# Patient Record
Sex: MALE | Race: WHITE | Employment: OTHER
[De-identification: names, ages, dates, MRNs, and addresses within clinical notes are randomized per-mention and may not be internally consistent; named-entity substitution may affect disease eponyms.]

---

## 2024-05-30 ENCOUNTER — APPOINTMENT (OUTPATIENT)
Facility: HOSPITAL | Age: 29
End: 2024-05-30
Payer: MEDICAID

## 2024-05-30 ENCOUNTER — HOSPITAL ENCOUNTER (EMERGENCY)
Facility: HOSPITAL | Age: 29
Discharge: HOME | End: 2024-05-30
Attending: EMERGENCY MEDICINE
Payer: MEDICAID

## 2024-05-30 VITALS
OXYGEN SATURATION: 99 % | BODY MASS INDEX: 42.55 KG/M2 | SYSTOLIC BLOOD PRESSURE: 130 MMHG | HEIGHT: 69 IN | DIASTOLIC BLOOD PRESSURE: 81 MMHG | RESPIRATION RATE: 18 BRPM | WEIGHT: 287.26 LBS | TEMPERATURE: 97.7 F | HEART RATE: 98 BPM

## 2024-05-30 DIAGNOSIS — S93.492A SPRAIN OF ANTERIOR TALOFIBULAR LIGAMENT OF LEFT ANKLE, INITIAL ENCOUNTER: Primary | ICD-10-CM

## 2024-05-30 PROCEDURE — 73610 X-RAY EXAM OF ANKLE: CPT

## 2024-05-30 PROCEDURE — 73630 X-RAY EXAM OF FOOT: CPT

## 2024-05-30 PROCEDURE — 99283 EMERGENCY DEPT VISIT LOW MDM: CPT

## 2024-05-30 NOTE — ED TRIAGE NOTES
Pt arrives with a care giver from the group home who states he fell going up stairs and injured his left ankle, pt is non verbal and ambulated to treatment area with even steady gait.

## 2024-05-30 NOTE — ED PROVIDER NOTES
[facility] EMERGENCY DEPT  EMERGENCY DEPARTMENT ENCOUNTER      Pt Name: Jacob De Luna  MRN: [id number]  Birthdate 1995  Date of evaluation: 5/30/2024  Provider: Radha Godfrey DO    CHIEF COMPLAINT       Chief Complaint   Patient presents with    Fall         HISTORY OF PRESENT ILLNESS   (Location/Symptom, Timing/Onset, Context/Setting, Quality, Duration, Modifying Factors, Severity)  Note limiting factors.   HPI      Review of External Medical Records:     Nursing Notes were reviewed.    REVIEW OF SYSTEMS    (2-9 systems for level 4, 10 or more for level 5)     Review of Systems    Except as noted above the remainder of the review of systems was reviewed and negative.       PAST MEDICAL HISTORY   No past medical history on file.      SURGICAL HISTORY     No past surgical history on file.      CURRENT MEDICATIONS       Previous Medications    No medications on file       ALLERGIES     Patient has no known allergies.    FAMILY HISTORY     No family history on file.       SOCIAL HISTORY       Social History     Socioeconomic History    Marital status: Single           PHYSICAL EXAM    (up to 7 for level 4, 8 or more for level 5)     ED Triage Vitals [05/30/24 1745]   BP Temp Temp Source Pulse Respirations SpO2 Height Weight - Scale   130/81 97.7 °F (36.5 °C) Oral 98 18 99 % 1.753 m (5' 9\") 130.3 kg (287 lb 4.2 oz)       Body mass index is 42.42 kg/m².    Physical Exam  Vitals and nursing note reviewed.   Constitutional:       Comments: Non verbal   Eyes:      Pupils: Pupils are equal, round, and reactive to light.   Cardiovascular:      Rate and Rhythm: Normal rate and regular rhythm.   Pulmonary:      Effort: Pulmonary effort is normal.   Musculoskeletal:         General: Swelling present.      Comments: Moderate swelling and pain with palpation over lateral malleolus, no fifth metatarsal tenderness, no fibular head tenderness, no pain with palpation over medial malleolus, decreased range of motion secondary to